# Patient Record
Sex: FEMALE
[De-identification: names, ages, dates, MRNs, and addresses within clinical notes are randomized per-mention and may not be internally consistent; named-entity substitution may affect disease eponyms.]

---

## 2023-07-06 ENCOUNTER — LABORATORY RESULT (OUTPATIENT)
Age: 33
End: 2023-07-06

## 2023-07-06 ENCOUNTER — APPOINTMENT (OUTPATIENT)
Dept: NEPHROLOGY | Facility: CLINIC | Age: 33
End: 2023-07-06
Payer: COMMERCIAL

## 2023-07-06 VITALS — HEART RATE: 72 BPM | DIASTOLIC BLOOD PRESSURE: 61 MMHG | SYSTOLIC BLOOD PRESSURE: 96 MMHG

## 2023-07-06 DIAGNOSIS — Z78.9 OTHER SPECIFIED HEALTH STATUS: ICD-10-CM

## 2023-07-06 DIAGNOSIS — R80.9 PROTEINURIA, UNSPECIFIED: ICD-10-CM

## 2023-07-06 DIAGNOSIS — I95.9 HYPOTENSION, UNSPECIFIED: ICD-10-CM

## 2023-07-06 PROBLEM — Z00.00 ENCOUNTER FOR PREVENTIVE HEALTH EXAMINATION: Status: ACTIVE | Noted: 2023-07-06

## 2023-07-06 PROCEDURE — 99204 OFFICE O/P NEW MOD 45 MIN: CPT

## 2023-07-06 RX ORDER — KETOROLAC TROMETHAMINE 10 MG/1
10 TABLET, FILM COATED ORAL 3 TIMES DAILY
Qty: 90 | Refills: 0 | Status: ACTIVE | COMMUNITY
Start: 2023-07-06

## 2023-07-06 NOTE — CONSULT LETTER
Patient : Melia Durand Age: 19 year old Sex: female   MRN: [id number] Encounter Date: 2022      History     Chief Complaint   Patient presents with   • Abdominal Pain     Pt is a   With LNMP 22 who presents to ED c/o vaginal bleeding 22 to 11/10/22, none since then and no pain.  She was advised by her to come to ED for uls to r/o ectopic pregnancy    The history is provided by the patient.   Vaginal Bleed- Pregnant   This is a new problem. The current episode started more than 1 week ago. The problem occurs rarely. The problem has been resolved. The discharge was normal. Pertinent negatives include no anorexia, no diaphoresis, no fever, no abdominal swelling, no abdominal pain, no constipation, no diarrhea, no nausea, no vomiting, no dyspareunia, no dysuria, no frequency, no genital burning, no genital itching and no genital lesions. She has tried nothing for the symptoms.       No Known Allergies    There are no discharge medications for this patient.      Past Medical History:   Diagnosis Date   • No pertinent past medical history        Past Surgical History:   Procedure Laterality Date   • NO PAST SURGERIES         Family History   Problem Relation Age of Onset   • Clotting Disorder Mother    • Stroke Mother    • Hypertension Mother    • Diabetes Mother    • Hypertension Maternal Grandmother    • Diabetes Maternal Grandmother        Social History     Tobacco Use   • Smoking status: Current Every Day Smoker   • Smokeless tobacco: Never Used   Substance Use Topics   • Alcohol use: Never   • Drug use: Yes     Types: Cannabinols       Review of Systems   Constitutional: Negative.  Negative for diaphoresis and fever.   HENT: Negative.    Eyes: Negative.    Respiratory: Negative.    Cardiovascular: Negative.    Gastrointestinal: Negative.  Negative for abdominal pain, anorexia, constipation, diarrhea, nausea and vomiting.   Endocrine: Negative.    Genitourinary: Negative.  Negative for dyspareunia,  [FreeTextEntry1] : Dear Jen,\par \par I had the pleasure of seeing Leticia Quintana in consultation for proteinuria. My note is attached.\par \par Thank you for the opportunity to participate in the care of your patient.\par \par Please call me on my mobile phone at [phone] or in the office at [phone] if you have any questions.\par \par Best regards,\par \par Emiliano\par \par Emiliano Benson MD, FACP, FASN\par [address], [place]\par [email or web address]\par Office: [phone]\par Mobile: [phone] dysuria and frequency.   Musculoskeletal: Negative.    Skin: Negative.    Allergic/Immunologic: Negative.    Neurological: Negative.    Hematological: Negative.    Psychiatric/Behavioral: Negative.        Physical Exam     ED Triage Vitals   ED Triage Vitals Group      Temp 11/21/22 1842 96.8 °F (36 °C)      Heart Rate 11/21/22 1844 (!) 117      Resp 11/21/22 1844 18      BP 11/21/22 1844 118/79      SpO2 11/21/22 1844 99 %      EtCO2 mmHg --       Height 11/21/22 1839 5' 4\" (1.626 m)      Weight 11/21/22 1839 149 lb (67.6 kg)      Weight Scale Used 11/22/22 0238 Scale in bed      BMI (Calculated) 11/21/22 1839 25.58      IBW/kg (Calculated) 11/21/22 1839 54.7       Physical Exam  Constitutional:       Appearance: Normal appearance. She is normal weight.   HENT:      Head: Normocephalic.      Mouth/Throat:      Mouth: Mucous membranes are moist.   Eyes:      Extraocular Movements: Extraocular movements intact.      Pupils: Pupils are equal, round, and reactive to light.   Cardiovascular:      Rate and Rhythm: Normal rate and regular rhythm.   Pulmonary:      Effort: Pulmonary effort is normal. No respiratory distress.   Abdominal:      General: Bowel sounds are normal. There is no distension.      Palpations: Abdomen is soft. There is no mass.   Genitourinary:     General: Normal vulva.      Exam position: Lithotomy position.      Vagina: Normal.      Cervix: No cervical bleeding.      Uterus: Normal.       Adnexa: Right adnexa normal and left adnexa normal.   Musculoskeletal:         General: Normal range of motion.      Right lower leg: No edema.      Left lower leg: No edema.   Skin:     General: Skin is warm and dry.      Capillary Refill: Capillary refill takes less than 2 seconds.   Neurological:      General: No focal deficit present.      Mental Status: She is alert and oriented to person, place, and time. Mental status is at baseline.   Psychiatric:         Mood and Affect: Mood normal.         Behavior:  Behavior normal.         Thought Content: Thought content normal.         ED Course     Procedures    Lab Results     Results for orders placed or performed during the hospital encounter of 11/21/22   Comprehensive Metabolic Panel   Result Value Ref Range    Fasting Status      Sodium 136 135 - 145 mmol/L    Potassium 3.6 3.4 - 5.1 mmol/L    Chloride 100 97 - 110 mmol/L    Carbon Dioxide 28 21 - 32 mmol/L    Anion Gap 12 7 - 19 mmol/L    Glucose 100 (H) 70 - 99 mg/dL    BUN 10 6 - 20 mg/dL    Creatinine 0.61 0.51 - 0.95 mg/dL    Glomerular Filtration Rate >90 >=60    BUN/ Creatinine Ratio 16 7 - 25    Calcium 9.2 8.4 - 10.2 mg/dL    Bilirubin, Total 0.3 0.2 - 1.0 mg/dL    GOT/AST 15 <=37 Units/L    GPT/ALT 16 <64 Units/L    Alkaline Phosphatase 61 45 - 117 Units/L    Albumin 4.2 3.6 - 5.1 g/dL    Protein, Total 8.0 6.4 - 8.2 g/dL    Globulin 3.8 2.0 - 4.0 g/dL    A/G Ratio 1.1 1.0 - 2.4   Beta HCG Quantitative Pregnancy   Result Value Ref Range    HCG, Quantitative 9,536 (H) <=4 mUnits/mL   CBC with Automated Differential (performable only)   Result Value Ref Range    WBC 12.1 (H) 4.2 - 11.0 K/mcL    RBC 4.18 4.00 - 5.20 mil/mcL    HGB 12.5 12.0 - 15.5 g/dL    HCT 38.6 36.0 - 46.5 %    MCV 92.3 78.0 - 100.0 fl    MCH 29.9 26.0 - 34.0 pg    MCHC 32.4 32.0 - 36.5 g/dL    RDW-CV 12.0 11.0 - 15.0 %    RDW-SD 40.8 39.0 - 50.0 fL     140 - 450 K/mcL    NRBC 0 <=0 /100 WBC    Neutrophil, Percent 62 %    Lymphocytes, Percent 28 %    Mono, Percent 7 %    Eosinophils, Percent 2 %    Basophils, Percent 1 %    Immature Granulocytes 0 %    Absolute Neutrophils 7.5 1.8 - 8.0 K/mcL    Absolute Lymphocytes 3.4 1.2 - 5.2 K/mcL    Absolute Monocytes 0.8 0.3 - 0.9 K/mcL    Absolute Eosinophils  0.2 0.0 - 0.5 K/mcL    Absolute Basophils 0.1 0.0 - 0.3 K/mcL    Absolute Immmature Granulocytes 0.0 0.0 - 0.2 K/mcL   COVID/Flu/RSV panel   Result Value Ref Range    Rapid SARS-COV-2 by PCR Not Detected Not Detected / Detected /  Presumptive Positive / Inhibitors present    Influenza A by PCR Not Detected Not Detected    Influenza B by PCR Not Detected Not Detected    RSV BY PCR Not Detected Not Detected    Isolation Guidelines      Procedural Comment     ABO/RH GROUP AND TYPE (D)   Result Value Ref Range    ABO/RH(D) O Rh Positive    URINALYSIS, MACROSCOPIC -POINT OF CARE   Result Value Ref Range    COLOR - POINT OF CARE Yellow     APPEARANCE, URINALYSIS - POINT OF CARE Clear     GLUCOSE, URINALYSIS - POINT OF CARE Negative Negative mg/dL    BILIRUBIN, URINALYSIS - POINT OF CARE Small (A) Negative    KETONES, URINALYSIS - POINT OF CARE Trace (A) Negative mg/dL    SPECIFIC GRAVITY, URINALYSIS - POINT OF CARE >1.030 (H) 1.005 - 1.030 NULL    OCCULT BLOOD, URINALYSIS - POINT OF CARE Negative Negative    PH, URINALYSIS - POINT OF CARE 5.5 5.0 - 7.0 Units    PROTEIN, URINALYSIS - POINT OF CARE 30  (A) Negative mg/dL    UROBILINOGEN, URINALYSIS - POINT OF CARE 0.2 0.2, 1.0 mg/dL    NITRITE, URINALYSIS - POINT OF CARE Negative Negative    WBC ESTERASE, URINALYSIS - POINT OF CARE Negative Negative   HCG POC   Result Value Ref Range    HCG, URINE - POINT OF CARE Positive (A) Negative       EKG Results     Radiology Results     Imaging Results          US OB LESS THAN 14 WEEKS AND US OB TRANSVAG SINGLE GESTATION (Final result)  Result time 11/21/22 23:31:31    Final result                 Impression:      1.  There is no evidence of intrauterine gestation and there is a 2 x 1.7 x  1.7 cm slight heterogeneous masslike area immediately adjacent to the left  ovary which may represent complex left paraovarian cyst; however I cannot  exclude left paraovarian ectopic gestation.  Findings/impression discussed  with Dr. Heather Ruiz 11/21/2022 at 11:27 PM.      Electronically Signed by: NOMI VARNER M.D.   Signed on: 11/21/2022 11:31 PM                Narrative:    EXAM: US OB LESS THAN 14 WKS AND US OB TRANSVAG SINGLE GESTATION    CLINICAL INDICATION:  Abdominal pain.  Bleeding.  Pregnancy.  Quantitative  beta hCG 9536.    COMPARISON: No previous examination for comparison.    FINDINGS:     Uterus measures 8.5 x 4.7 x 3.3 cm.  No discrete uterine mass.  Endometrial  stripe measures 0.2 cm and appears uniform.  No evidence of intrauterine  gestational sac, fetal pole or yolk sac.      Right ovary measures 2.9 x 2.5 x 1.2 cm.  Right ovary appears normal in  size and configuration.  Left ovary measures 3.3 x 2.2 x 1.9 cm.  There is  a 2 x 1.7 x 1.7 cm slight heterogeneous masslike area immediately adjacent  to the left ovary.  No evidence of free fluid within the the cul-de-sac or  adnexa.                                ED Medication Orders (From admission, onward)    Ordered Start     Status Ordering Provider    11/22/22 0127 11/22/22 0128  diphenhydrAMINE (BENADRYL) injection 25 mg  ONCE         Last MAR action: Given MATT VALLEJO          ED Course as of 11/22/22 0258   Tue Nov 22, 2022   0052 As d/w dr fletcher, admit obs,  likely surgery [GB]      ED Course User Index  [GB] Matt Vallejo MD       MDM  Number of Diagnoses or Management Options     Amount and/or Complexity of Data Reviewed  Clinical lab tests: reviewed  Tests in the radiology section of CPT®: reviewed  Tests in the medicine section of CPT®: reviewed  Decide to obtain previous medical records or to obtain history from someone other than the patient: yes  Obtain history from someone other than the patient: yes  Discuss the patient with other providers: yes  Independent visualization of images, tracings, or specimens: yes    Risk of Complications, Morbidity, and/or Mortality  Presenting problems: moderate  Diagnostic procedures: moderate  Management options: moderate    Patient Progress  Patient progress: improved      Clinical Impression     ED Diagnosis   1. Ectopic pregnancy without intrauterine pregnancy, unspecified location         Disposition        Admit 11/22/2022 12:54 AM  Telemetry Bed?:  No  Admitting Physician: VICTOR MANUEL YOUSSEF [[id number]]  Is this a telephone or verbal order?: This is a telephone order from the admitting physician                     Heather Ruiz MD  11/22/22 0258

## 2023-07-06 NOTE — HISTORY OF PRESENT ILLNESS
[FreeTextEntry1] : Kindly referred by Dr. Aldana for proteinuria.\par \par Labs from phone reviewed. 1+ protein on U/A with SG of 1.024. No RBCs. Creatinine 0.77.  Normal delivery 7 months ago without preeclampsia. No HTN or DM. \par \par Baseline low BP without lightheadedness. \par \par On ketorolac for 1 week for fissure surgery. \par \par

## 2023-07-08 LAB
ALBUPE: 35 %
ALPHA1UPE: 24.8 %
ALPHA2UPE: 14.1 %
APPEARANCE: CLEAR
BETAUPE: 12.9 %
BILIRUBIN URINE: NEGATIVE
BLOOD URINE: NEGATIVE
COLOR: YELLOW
CREAT SPEC-SCNC: 45 MG/DL
CREAT SPEC-SCNC: 45 MG/DL
CREAT/PROT UR: 0.1 RATIO
GAMMAUPE: 13.2 %
GLUCOSE QUALITATIVE U: NEGATIVE MG/DL
IGA 24H UR QL IFE: NORMAL
KAPPA LC 24H UR QL: NORMAL
KETONES URINE: NEGATIVE MG/DL
LEUKOCYTE ESTERASE URINE: ABNORMAL
MICROALBUMIN 24H UR DL<=1MG/L-MCNC: <1.2 MG/DL
MICROALBUMIN/CREAT 24H UR-RTO: NORMAL MG/G
NITRITE URINE: NEGATIVE
PH URINE: 6.5
PROT PATTERN 24H UR ELPH-IMP: NORMAL
PROT UR-MCNC: 4 MG/DL
PROT UR-MCNC: 9 MG/DL
PROT UR-MCNC: 9 MG/DL
PROTEIN URINE: NEGATIVE MG/DL
SPECIFIC GRAVITY URINE: 1.01
UROBILINOGEN URINE: 0.2 MG/DL